# Patient Record
Sex: MALE | Race: WHITE | NOT HISPANIC OR LATINO | URBAN - METROPOLITAN AREA
[De-identification: names, ages, dates, MRNs, and addresses within clinical notes are randomized per-mention and may not be internally consistent; named-entity substitution may affect disease eponyms.]

---

## 2024-02-20 NOTE — PROGRESS NOTES
[facility] for Subdural Hematoma s/p fall on 2/17/24. CTH done 2/17/24. Pt notes has had 4 falls over last 3 moths with [facility] and [facility]. CT head/neck done 2/16/24, CTH done 3/12/23.     Jose Shah is a 58 y.o. year old male    HPI  Mr. Shah is a 58-year-old gentleman who fell with loss of consciousness and amnestic to the events.  [facility] showed [facility] very small 2 to 3 mm right acute subdural hematoma.  Repeat CT demonstrate possible improvement subdural hematoma.    He is referred for further evaluation.    No headaches or other symptoms      Review of Systems       Past Medical History:   Diagnosis Date    Cellulitis of left lower limb 07/18/2017    Cellulitis of foot, left    Cellulitis of left lower limb 10/24/2017    Cellulitis of foot, left    Cellulitis of right lower limb 07/18/2017    Cellulitis of foot, right    Laceration without foreign body, right lower leg, initial encounter 02/22/2018    Skin tear of right lower leg without complication, initial encounter    Non-pressure chronic ulcer of other part of left foot limited to breakdown of skin (CMS/[facility]) 12/05/2017    Skin ulcer of left foot, limited to breakdown of skin    Other acute postprocedural pain 11/28/2017    Post-op pain    Pain in left foot 07/24/2018    Acute foot pain, left    Personal history of colonic polyps     History of colon polyps    Personal history of other endocrine, nutritional and metabolic disease 11/28/2016    History of insulin dependent diabetes mellitus    Pressure ulcer of other site, stage 3 (CMS/[facility]) 10/24/2017    Decubitus ulcer of left foot, stage 3    Pressure ulcer of other site, stage 3 (CMS/[facility]) 10/05/2017    Pressure ulcer of dorsum of left foot, stage 3    Third (oculomotor) nerve palsy, left eye     Third cranial nerve weakness, left    Type 2 diabetes mellitus with foot ulcer (CODE) (CMS/[facility]) 12/14/2017    Diabetic ulcer of left midfoot associated with  type 2 diabetes mellitus, with fat layer exposed    Type 2 diabetes mellitus with foot ulcer (CODE) (CMS/[facility]) 04/24/2018    Diabetic ulcer of toe of left foot associated with type 2 diabetes mellitus, with fat layer exposed    Type 2 diabetes mellitus with mild nonproliferative diabetic retinopathy without macular edema, unspecified eye (CMS/[facility]) 09/29/2014    NPDR (nonproliferative diabetic retinopathy)    Unspecified chorioretinal scars, bilateral 02/23/2017    Chorioretinal scar of both eyes    Unspecified mononeuropathy of unspecified lower limb     Neuropathy of foot       Past Surgical History:   Procedure Laterality Date    CATARACT EXTRACTION  10/23/2018    Cataract Surgery    COLONOSCOPY  11/18/2016    Colonoscopy (Fiberoptic)    CORONARY ANGIOPLASTY WITH STENT PLACEMENT  08/10/2017    Cath Stent Placement    EXPLORATORY LAPAROTOMY  11/18/2016    Exploratory Laparotomy    GALLBLADDER SURGERY  08/26/2013    Gallbladder Surgery    MR HEAD ANGIO WO IV CONTRAST  3/14/2013    MR HEAD ANGIO WO IV CONTRAST 3/14/2013 [facility] [place] [place] [facility]GENCY LEGACY    MR HEAD ANGIO WO IV CONTRAST  8/5/2013    MR HEAD ANGIO WO IV CONTRAST 8/5/2013 [facility] CLINICAL LEGACY    MR NECK ANGIO WO IV CONTRAST  3/14/2013    MR NECK ANGIO WO IV CONTRAST 3/14/2013 [facility] [place] [place] [facility]GENCY LEGACY    NECK SURGERY  08/26/2013    Neck Surgery    OTHER SURGICAL HISTORY  11/18/2016    Cardiac Cath Procedure Outcome: Successful    PILONIDAL CYST DRAINAGE  11/18/2016    Pilonidal Cyst Resection           Current Outpatient Medications:     aspirin 81 mg EC tablet, Take 1 tablet (81 mg) by mouth once daily., Disp: , Rfl:     atorvastatin (Lipitor) 10 mg tablet, Take 1 tablet (10 mg) by mouth once daily., Disp: , Rfl:     B complex-vitamin C-folic acid (Doug Caps) 1 mg capsule, Take 1 capsule by mouth once daily., Disp: , Rfl:     calcitriol (Rocaltrol) 0.25 mcg capsule, Take 1 capsule (0.25 mcg) by mouth every other day., Disp: , Rfl:     cetirizine (ZyrTEC) 5 mg tablet, Take  1 tablet (5 mg) by mouth once daily., Disp: , Rfl:     estradiol (Vivelle-DOT) 0.1 mg/24 hr patch, Place 1 patch on the skin 2 times a week., Disp: , Rfl:     famotidine (Pepcid AC) 10 mg tablet, Take 1 tablet (10 mg) by mouth once daily., Disp: , Rfl:     FLUoxetine (PROzac) 20 mg capsule, Take 1 capsule (20 mg) by mouth 3 times a day., Disp: , Rfl:     gabapentin (Neurontin) 100 mg capsule, Take 1 capsule (100 mg) by mouth if needed (ONLY MONDAY WEDNESDAY FRIDAY)., Disp: , Rfl:     isosorbide mononitrate ER (Imdur) 30 mg 24 hr tablet, Take 1 tablet (30 mg) by mouth if needed., Disp: , Rfl:     metoclopramide (Reglan) 5 mg tablet, Take 1 tablet (5 mg) by mouth if needed., Disp: , Rfl:     metoprolol succinate XL (Toprol-XL) 25 mg 24 hr tablet, Take 0.5 tablets (12.5 mg) by mouth once daily at bedtime., Disp: , Rfl:     sacubitriL-valsartan (Entresto)  mg tablet, Take 1 tablet by mouth if needed., Disp: , Rfl:     sennosides (Senokot) 8.6 mg tablet, Take 2 tablets (17.2 mg) by mouth 2 times a day., Disp: , Rfl:         Objective   Vitals:    02/22/24 1032   BP: 104/52   Pulse: 66   Resp: 20       Neurological Exam  AAO x 3  PERRL, EOMI, TS, TML  5/5  Senorsy intact  No drift'      [unfilled]  [facility] to [facility] mm small right frontal subdural hematoma with no mass effect.      Assessment/Plan     I had the pleasure of seeing Mr. Shah and had a thorough discussion as well as reviewed his CT together.  He had a very small 2 mm right frontal subdural this nonoperative and stable on repeat CT.  He may resume his aspirin as indicated.  No further imaging or follow-up is needed.  All questions were answered to his satisfaction.

## 2024-02-22 ENCOUNTER — OFFICE VISIT (OUTPATIENT)
Dept: NEUROSURGERY | Facility: HOSPITAL | Age: 59
End: 2024-02-22
Payer: MEDICARE

## 2024-02-22 VITALS
WEIGHT: 225 LBS | HEART RATE: 66 BPM | RESPIRATION RATE: 20 BRPM | SYSTOLIC BLOOD PRESSURE: 104 MMHG | BODY MASS INDEX: 31.5 KG/M2 | DIASTOLIC BLOOD PRESSURE: 52 MMHG | HEIGHT: 71 IN

## 2024-02-22 DIAGNOSIS — S06.5XAA SUBDURAL HEMATOMA (MULTI): Primary | ICD-10-CM

## 2024-02-22 PROCEDURE — 99212 OFFICE O/P EST SF 10 MIN: CPT | Performed by: NEUROLOGICAL SURGERY

## 2024-02-22 PROCEDURE — 1036F TOBACCO NON-USER: CPT | Performed by: NEUROLOGICAL SURGERY

## 2024-02-22 PROCEDURE — 99202 OFFICE O/P NEW SF 15 MIN: CPT | Performed by: NEUROLOGICAL SURGERY

## 2024-02-22 RX ORDER — ATORVASTATIN CALCIUM 10 MG/1
10 TABLET, FILM COATED ORAL DAILY
COMMUNITY
Start: 2023-10-18

## 2024-02-22 RX ORDER — METOPROLOL SUCCINATE 25 MG/1
0.5 TABLET, EXTENDED RELEASE ORAL NIGHTLY
COMMUNITY
Start: 2023-11-24

## 2024-02-22 RX ORDER — CALCITRIOL 0.25 UG/1
0.25 CAPSULE ORAL EVERY OTHER DAY
COMMUNITY
Start: 2023-11-24

## 2024-02-22 RX ORDER — CETIRIZINE HYDROCHLORIDE 5 MG/1
5 TABLET ORAL DAILY
COMMUNITY
Start: 2023-03-17

## 2024-02-22 RX ORDER — METOCLOPRAMIDE 5 MG/1
5 TABLET ORAL AS NEEDED
COMMUNITY
Start: 2018-02-06

## 2024-02-22 RX ORDER — SENNOSIDES 8.6 MG/1
17.2 TABLET ORAL 2 TIMES DAILY
COMMUNITY
Start: 2023-10-18

## 2024-02-22 RX ORDER — GABAPENTIN 100 MG/1
100 CAPSULE ORAL AS NEEDED
COMMUNITY
Start: 2017-06-24

## 2024-02-22 RX ORDER — ESTRADIOL 0.1 MG/D
1 FILM, EXTENDED RELEASE TRANSDERMAL 2 TIMES WEEKLY
COMMUNITY
Start: 2023-12-01

## 2024-02-22 RX ORDER — FAMOTIDINE 10 MG/1
10 TABLET ORAL DAILY
COMMUNITY
Start: 2024-02-03

## 2024-02-22 RX ORDER — FLUOXETINE HYDROCHLORIDE 20 MG/1
20 CAPSULE ORAL 3 TIMES DAILY
COMMUNITY
Start: 2020-08-26

## 2024-02-22 RX ORDER — RENO CAPS 100; 1.5; 1.7; 20; 10; 1; 150; 5; 6 MG/1; MG/1; MG/1; MG/1; MG/1; MG/1; UG/1; MG/1; UG/1
1 CAPSULE ORAL DAILY
COMMUNITY
Start: 2018-08-02

## 2024-02-22 RX ORDER — ISOSORBIDE MONONITRATE 30 MG/1
30 TABLET, EXTENDED RELEASE ORAL AS NEEDED
COMMUNITY
Start: 2021-04-01

## 2024-02-22 RX ORDER — ASPIRIN 81 MG/1
81 TABLET ORAL DAILY
COMMUNITY
Start: 2017-05-28

## 2024-02-22 ASSESSMENT — COLUMBIA-SUICIDE SEVERITY RATING SCALE - C-SSRS
6. HAVE YOU EVER DONE ANYTHING, STARTED TO DO ANYTHING, OR PREPARED TO DO ANYTHING TO END YOUR LIFE?: NO
1. IN THE PAST MONTH, HAVE YOU WISHED YOU WERE DEAD OR WISHED YOU COULD GO TO SLEEP AND NOT WAKE UP?: NO
2. HAVE YOU ACTUALLY HAD ANY THOUGHTS OF KILLING YOURSELF?: NO

## 2024-02-22 ASSESSMENT — PATIENT HEALTH QUESTIONNAIRE - PHQ9
1. LITTLE INTEREST OR PLEASURE IN DOING THINGS: NOT AT ALL
SUM OF ALL RESPONSES TO PHQ9 QUESTIONS 1 AND 2: 0
2. FEELING DOWN, DEPRESSED OR HOPELESS: NOT AT ALL

## 2024-02-22 ASSESSMENT — ENCOUNTER SYMPTOMS
LOSS OF SENSATION IN FEET: 1
DEPRESSION: 1
OCCASIONAL FEELINGS OF UNSTEADINESS: 1

## 2024-06-26 ENCOUNTER — LAB REQUISITION (OUTPATIENT)
Dept: LAB | Facility: HOSPITAL | Age: 59
End: 2024-06-26
Payer: MEDICARE

## 2024-06-26 DIAGNOSIS — E87.5 HYPERKALEMIA: ICD-10-CM

## 2024-06-26 LAB — POTASSIUM SERPL-SCNC: 3.7 MMOL/L (ref 3.5–5.3)

## 2024-06-26 PROCEDURE — 84132 ASSAY OF SERUM POTASSIUM: CPT

## 2024-07-31 ENCOUNTER — LAB REQUISITION (OUTPATIENT)
Dept: LAB | Facility: HOSPITAL | Age: 59
End: 2024-07-31
Payer: MEDICARE

## 2024-07-31 DIAGNOSIS — E87.5 HYPERKALEMIA: ICD-10-CM

## 2024-07-31 DIAGNOSIS — N18.6 END STAGE RENAL DISEASE (MULTI): ICD-10-CM

## 2024-07-31 DIAGNOSIS — Z99.2 DEPENDENCE ON RENAL DIALYSIS (CMS-HCC): ICD-10-CM

## 2024-07-31 LAB — POTASSIUM SERPL-SCNC: 3.7 MMOL/L (ref 3.5–5.3)

## 2024-07-31 PROCEDURE — 84132 ASSAY OF SERUM POTASSIUM: CPT | Mod: OUT | Performed by: INTERNAL MEDICINE

## 2024-08-30 ENCOUNTER — LAB REQUISITION (OUTPATIENT)
Dept: LAB | Facility: HOSPITAL | Age: 59
End: 2024-08-30
Payer: MEDICARE

## 2024-08-30 DIAGNOSIS — E87.5 HYPERKALEMIA: ICD-10-CM

## 2024-08-30 LAB
HOLD SPECIMEN: NORMAL
POTASSIUM SERPL-SCNC: 3.6 MMOL/L (ref 3.5–5.3)

## 2024-08-30 PROCEDURE — 84132 ASSAY OF SERUM POTASSIUM: CPT | Mod: OUT | Performed by: INTERNAL MEDICINE
